# Patient Record
Sex: MALE
[De-identification: names, ages, dates, MRNs, and addresses within clinical notes are randomized per-mention and may not be internally consistent; named-entity substitution may affect disease eponyms.]

---

## 2017-11-30 PROBLEM — Z00.00 ENCOUNTER FOR PREVENTIVE HEALTH EXAMINATION: Status: ACTIVE | Noted: 2017-11-30

## 2017-12-01 ENCOUNTER — APPOINTMENT (OUTPATIENT)
Dept: FAMILY MEDICINE | Facility: CLINIC | Age: 22
End: 2017-12-01
Payer: COMMERCIAL

## 2017-12-01 ENCOUNTER — RECORD ABSTRACTING (OUTPATIENT)
Age: 22
End: 2017-12-01

## 2017-12-01 VITALS
WEIGHT: 182 LBS | DIASTOLIC BLOOD PRESSURE: 70 MMHG | SYSTOLIC BLOOD PRESSURE: 120 MMHG | TEMPERATURE: 98.3 F | BODY MASS INDEX: 26.96 KG/M2 | HEIGHT: 69 IN

## 2017-12-01 DIAGNOSIS — Z11.1 ENCOUNTER FOR SCREENING FOR RESPIRATORY TUBERCULOSIS: ICD-10-CM

## 2017-12-01 DIAGNOSIS — Z87.09 PERSONAL HISTORY OF OTHER DISEASES OF THE RESPIRATORY SYSTEM: ICD-10-CM

## 2017-12-01 DIAGNOSIS — K21.9 GASTRO-ESOPHAGEAL REFLUX DISEASE W/OUT ESOPHAGITIS: ICD-10-CM

## 2017-12-01 PROCEDURE — 99213 OFFICE O/P EST LOW 20 MIN: CPT

## 2019-01-21 ENCOUNTER — APPOINTMENT (OUTPATIENT)
Dept: FAMILY MEDICINE | Facility: CLINIC | Age: 24
End: 2019-01-21

## 2019-03-14 ENCOUNTER — APPOINTMENT (OUTPATIENT)
Dept: GASTROENTEROLOGY | Facility: CLINIC | Age: 24
End: 2019-03-14
Payer: COMMERCIAL

## 2019-03-14 VITALS
TEMPERATURE: 98 F | WEIGHT: 158 LBS | HEIGHT: 69 IN | OXYGEN SATURATION: 99 % | DIASTOLIC BLOOD PRESSURE: 78 MMHG | RESPIRATION RATE: 16 BRPM | BODY MASS INDEX: 23.4 KG/M2 | SYSTOLIC BLOOD PRESSURE: 118 MMHG | HEART RATE: 81 BPM

## 2019-03-14 DIAGNOSIS — K50.00 CROHN'S DISEASE OF SMALL INTESTINE W/OUT COMPLICATIONS: ICD-10-CM

## 2019-03-14 PROCEDURE — 99244 OFF/OP CNSLTJ NEW/EST MOD 40: CPT

## 2019-03-14 RX ORDER — BACILLUS COAGULANS/INULIN 1B-250 MG
CAPSULE ORAL
Refills: 0 | Status: ACTIVE | COMMUNITY

## 2019-03-14 RX ORDER — ADALIMUMAB 40MG/0.8ML
40 KIT SUBCUTANEOUS
Refills: 0 | Status: ACTIVE | COMMUNITY

## 2019-03-14 RX ORDER — AZITHROMYCIN DIHYDRATE 250 MG/1
250 TABLET, FILM COATED ORAL
Qty: 1 | Refills: 0 | Status: DISCONTINUED | COMMUNITY
Start: 2017-12-01 | End: 2019-03-14

## 2019-03-14 RX ORDER — MESALAMINE 500 MG/1
500 CAPSULE ORAL DAILY
Refills: 0 | Status: ACTIVE | COMMUNITY

## 2019-03-14 RX ORDER — POLYMYXIN B SULFATE AND TRIMETHOPRIM 10000; 1 [USP'U]/ML; MG/ML
10000-0.1 SOLUTION OPHTHALMIC 3 TIMES DAILY
Qty: 1 | Refills: 0 | Status: DISCONTINUED | COMMUNITY
Start: 2017-12-01 | End: 2019-03-14

## 2019-03-14 RX ORDER — FAMOTIDINE 20 MG/1
20 TABLET, FILM COATED ORAL
Refills: 0 | Status: ACTIVE | COMMUNITY

## 2019-03-14 RX ORDER — MULTIVITAMIN
TABLET ORAL
Refills: 0 | Status: ACTIVE | COMMUNITY

## 2019-03-14 NOTE — ASSESSMENT
[FreeTextEntry1] : 23-year-old male, Crohn's disease of the small bowel, recent exacerbation, diminishing but still present affected Humira associated with subtherapeutic blood levels.\par \par Plan\par Lengthy discussion with patient regarding my recommendations for dose escalation of Humira to weekly dosing.\par Explained that while the mechanism of his inflammation may change over time, that it appears that he still has a desirable effect from Humira, and that there is a strong possibility of further benefit with dose escalation.\par \par Patient and his parents both expressed good understanding and agreement\par Will return to his primary gastroenterologist for further care\par Follow up here as needed\par \par Patient referred by Dr. Nate Natarajan

## 2019-03-14 NOTE — HISTORY OF PRESENT ILLNESS
[de-identified] : 23-year-old male, history of Crohn's disease since the age of 15 years presents for second opinion.\par Following his initial diagnosis by colonoscopy, complaints of diarrhea, abdominal discomfort bloating patient failed treatment with Pentasa and 6 mercaptopurine, subsequently switched to Humira about 2 years ago, with generally excellent results. Global improvement of his complaints. Patient reports pre-and post Humira colonoscopy with improvement of inflammation, largely in the terminal ileum (reports unavailable)\par \par More recently, patient suffered recurrence of his complaints, CAT scan of the abdomen and pelvis January 2019 showing enteritis of the distal small bowel and proximal colon mesenteric adenopathy dilated small bowel.  Patient socially hospitalized, treated with antibiotics for several days, no corticosteroids, clinical improvement.  Followup MRI February 2019 showed improvement of enteritis and a decrease of the small bowel dilation.\par \par Patient continues Humira every other week. Reports that trough blood level checked day before an injection showed a level of 1.2, lower than the recommended therapeutic range. Uncertain of antibody level\par \par Patient does report that while he is mostly back to his baseline, feeling better after Humira, that there is some diminishing effect over the 2 weeks until his next injection\par \par Patient is a nonsmoker, strong family history of Crohn's disease, father, paternal uncle, possibly grandfather\par \par Denies any arthritic complaints, though has experienced cold sores\par \par Throughout his disease history patient has never used corticosteroids, no history of surgery, no history of perianal complaints\par \par Social history: Nonsmoker, hospital

## 2020-12-15 PROBLEM — Z87.09 HISTORY OF PHARYNGITIS: Status: RESOLVED | Noted: 2017-12-01 | Resolved: 2020-12-15

## 2021-04-08 ENCOUNTER — APPOINTMENT (OUTPATIENT)
Dept: FAMILY MEDICINE | Facility: CLINIC | Age: 26
End: 2021-04-08
Payer: COMMERCIAL

## 2021-04-08 VITALS
HEIGHT: 69 IN | WEIGHT: 175 LBS | HEART RATE: 75 BPM | TEMPERATURE: 98.2 F | SYSTOLIC BLOOD PRESSURE: 126 MMHG | BODY MASS INDEX: 25.92 KG/M2 | OXYGEN SATURATION: 97 % | DIASTOLIC BLOOD PRESSURE: 80 MMHG

## 2021-04-08 DIAGNOSIS — H10.31 UNSPECIFIED ACUTE CONJUNCTIVITIS, RIGHT EYE: ICD-10-CM

## 2021-04-08 DIAGNOSIS — Z01.818 ENCOUNTER FOR OTHER PREPROCEDURAL EXAMINATION: ICD-10-CM

## 2021-04-08 DIAGNOSIS — S86.019A STRAIN OF UNSPECIFIED ACHILLES TENDON, INITIAL ENCOUNTER: ICD-10-CM

## 2021-04-08 PROCEDURE — 99214 OFFICE O/P EST MOD 30 MIN: CPT

## 2021-04-08 PROCEDURE — 99072 ADDL SUPL MATRL&STAF TM PHE: CPT

## 2021-04-09 PROBLEM — H10.31 ACUTE CONJUNCTIVITIS OF RIGHT EYE, UNSPECIFIED ACUTE CONJUNCTIVITIS TYPE: Status: RESOLVED | Noted: 2017-12-01 | Resolved: 2021-04-09

## 2021-04-09 PROBLEM — S86.019A ACHILLES TENDON RUPTURE: Status: ACTIVE | Noted: 2021-04-09

## 2021-04-09 PROBLEM — Z01.818 PREOPERATIVE EXAMINATION: Status: ACTIVE | Noted: 2021-04-09

## 2021-04-09 NOTE — HISTORY OF PRESENT ILLNESS
[No Pertinent Cardiac History] : no history of aortic stenosis, atrial fibrillation, coronary artery disease, recent myocardial infarction, or implantable device/pacemaker [No Pertinent Pulmonary History] : no history of asthma, COPD, sleep apnea, or smoking [No Adverse Anesthesia Reaction] : no adverse anesthesia reaction in self or family member [(Patient denies any chest pain, claudication, dyspnea on exertion, orthopnea, palpitations or syncope)] : Patient denies any chest pain, claudication, dyspnea on exertion, orthopnea, palpitations or syncope [Chronic Anticoagulation] : no chronic anticoagulation [Chronic Kidney Disease] : no chronic kidney disease [Diabetes] : no diabetes [FreeTextEntry1] : tendon repair [FreeTextEntry2] : 4/12/21 [FreeTextEntry3] : Dr. Kaplan [FreeTextEntry4] : Had injury to achilles